# Patient Record
Sex: FEMALE | Race: WHITE | NOT HISPANIC OR LATINO | Employment: FULL TIME | URBAN - METROPOLITAN AREA
[De-identification: names, ages, dates, MRNs, and addresses within clinical notes are randomized per-mention and may not be internally consistent; named-entity substitution may affect disease eponyms.]

---

## 2017-02-06 ENCOUNTER — HOSPITAL ENCOUNTER (EMERGENCY)
Facility: MEDICAL CENTER | Age: 27
End: 2017-02-06
Attending: EMERGENCY MEDICINE
Payer: MEDICAID

## 2017-02-06 VITALS
WEIGHT: 192.46 LBS | SYSTOLIC BLOOD PRESSURE: 115 MMHG | HEART RATE: 55 BPM | HEIGHT: 63 IN | OXYGEN SATURATION: 98 % | TEMPERATURE: 98.5 F | DIASTOLIC BLOOD PRESSURE: 63 MMHG | BODY MASS INDEX: 34.1 KG/M2 | RESPIRATION RATE: 18 BRPM

## 2017-02-06 DIAGNOSIS — J03.90 ACUTE TONSILLITIS, UNSPECIFIED ETIOLOGY: ICD-10-CM

## 2017-02-06 PROCEDURE — 99283 EMERGENCY DEPT VISIT LOW MDM: CPT | Mod: EDC

## 2017-02-06 RX ORDER — AZITHROMYCIN 250 MG/1
TABLET, FILM COATED ORAL
Qty: 6 TAB | Refills: 0 | Status: SHIPPED | OUTPATIENT
Start: 2017-02-06 | End: 2018-01-11

## 2017-02-06 ASSESSMENT — PAIN SCALES - GENERAL: PAINLEVEL_OUTOF10: 0

## 2017-02-06 NOTE — ED AVS SNAPSHOT
[employer] Access Code: [email or web address]  Expires: 3/8/2017  8:29 AM    Your email address is not on file at [facility].  Email Addresses are required for you to sign up for [employer], please contact [phone] to verify your personal information and to provide your email address prior to attempting to register for [employer].    Carrie Adler  [address] [place] [address]    [employer]  A secure, online tool to manage your health information     [facility]’s [employer]® is a secure, online tool that connects you to your personalized health information from the privacy of your home -- day or night - making it very easy for you to manage your healthcare. Once the activation process is completed, you can even access your medical information using the [employer] ashley, which is available for free in the Apple Ashley store or Google Play store.     To learn more about [employer], visit [email or web address][email or web address]t    There are two levels of access available (as shown below):   My Chart Features  [facility] Primary Care Doctor [facility]  Specialists [facility]  Urgent  Care Non-[facility] Primary Care Doctor   Email your healthcare team securely and privately 24/7 X X X    Manage appointments: schedule your next appointment; view details of past/upcoming appointments X      Request prescription refills. X      View recent personal medical records, including lab and immunizations X X X X   View health record, including health history, allergies, medications X X X X   Read reports about your outpatient visits, procedures, consult and ER notes X X X X   See your discharge summary, which is a recap of your hospital and/or ER visit that includes your diagnosis, lab results, and care plan X X  X     How to register for "[employer]t:  Once your e-mail address has been verified, follow the following steps to sign up for "[employer]t.     1. Go to  [email or web address]  2. Click on the Sign Up Now box, which takes you to the New Member Sign Up page. You  will need to provide the following information:  a. Enter your [employer] Access Code exactly as it appears at the top of this page. (You will not need to use this code after you’ve completed the sign-up process. If you do not sign up before the expiration date, you must request a new code.)   b. Enter your date of birth.   c. Enter your home email address.   d. Click Submit, and follow the next screen’s instructions.  3. Create a [employer]t ID. This will be your [employer] login ID and cannot be changed, so think of one that is secure and easy to remember.  4. Create a [employer] password. You can change your password at any time.  5. Enter your Password Reset Question and Answer. This can be used at a later time if you forget your password.   6. Enter your e-mail address. This allows you to receive e-mail notifications when new information is available in [employer].  7. Click Sign Up. You can now view your health information.    For assistance activating your [employer] account, call [phone]

## 2017-02-06 NOTE — ED PROVIDER NOTES
"ED Provider Note    CHIEF COMPLAINT  Chief Complaint   Patient presents with   • Sore Throat       HPI  Carrie Adler is a 26 y.o. female who presents for 4 days of sore throat with pus on tonsils. Denies fever or headache rhinorrhea or cough. 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 Denies pregnancy. Daughter is ill with cough.    REVIEW OF SYSTEMS  Pertinent positives include: Sore throat, exudate.  Pertinent negatives include: Vomiting, diarrhea, rash, abdominal pain, pregnancy.    PAST MEDICAL HISTORY  Past Medical History   Diagnosis Date   • Endometriosis 7/17/2009   • Endometriosis        SOCIAL HISTORY  No tobacco use.    CURRENT MEDICATIONS  Home Medications     Reviewed by Princess Weems R.N. (Registered Nurse) on 02/06/17 at 0751  Med List Status: Complete    Medication Last Dose Status          Patient Margarito Taking any Medications                        ALLERGIES  Allergies   Allergen Reactions   • Penicillins Rash       PHYSICAL EXAM  VITAL SIGNS: /67 mmHg  Pulse 69  Temp(Src) 36.2 °C (97.2 °F)  Resp 20  Ht 1.6 m (5' 3\")  Wt 87.3 kg (192 lb 7.4 oz)  BMI 34.10 kg/m2  SpO2 96%  LMP 12/16/2010  Constitutional :  Well developed, Well nourished, vitals reviewed and normal including no hypoxia room air.   HNT: Atraumatic, tender submandibular glands, no trismus, 3+ mildly erythematous tonsils with scant exudate, uvula midline.   Ears: External ears normal.  Eyes: pupils reactive without eye discharge nor conjunctival hyperemia.  Neck: Normal range of motion, No tenderness, Supple, No stridor.   Lymphatic: No adenopathy.   Cardiovascular: Regular rhythm, No murmurs, No rubs, No gallops.  No cyanosis.   Respiratory: No rales, rhonchi, wheeze  Abdomen:  Soft, nontender  Skin: Warm, dry, no erythema, no rash.   Musculoskeletal: no limb deformities.      COURSE & MEDICAL DECISION MAKING  Well-appearing patient presents with an exudative acute " tonsillitis without evidence of peritonsillar abscess, epiglottitis, retropharyngeal abscess. This may be strep.    PLAN:  New Prescriptions    AZITHROMYCIN (ZITHROMAX) 250 MG TAB    Sig: Per instructions on packet     Return for ill appearance or inability to swallow  Ibuprofen and Tylenol  Tonsillitis handout given    Martin Brown M.D.  [address] [place] [address] [address]  [facility] [address]  [phone]    Schedule an appointment as soon as possible for a visit in 3 days  As needed        CONDITION:  Good.    FINAL IMPRESSION:  1. Acute tonsillitis, unspecified etiology          Electronically signed by: Lb Mckeon, 2/6/2017

## 2017-02-06 NOTE — ED NOTES
Discharge instructions reviewed with pt regarding strep, azithromax.  Pt instructed on signs and symptoms to return to ED, no questions regarding this.   Instructed to follow-up with PCP.  Pt has no questions at this time, VSS.  Pt leaves alert, age appropriate and in NAD.

## 2017-02-06 NOTE — ED AVS SNAPSHOT
Home Care Instructions                                                                                                                Carrie Adler   MRN: [id number]    Department:  [facility], Emergency Dept   Date of Visit:  2/6/2017            [facility], Emergency Dept    [address] [address]    Phone:  [phone]      You were seen by     Lb Mckeon M.D.      Your Diagnosis Was     Acute tonsillitis, unspecified etiology     J03.90       Follow-up Information     1. Follow up with Martin Brown M.D.. Schedule an appointment as soon as possible for a visit in 3 days.    Specialty:  OB/Gyn    Why:  As needed    Contact information    [address]  [facility] [id number] [phone]        Medication Information     Review all of your home medications and newly ordered medications with your primary doctor and/or pharmacist as soon as possible. Follow medication instructions as directed by your doctor and/or pharmacist.     Please keep your complete medication list with you and share with your physician. Update the information when medications are discontinued, doses are changed, or new medications (including over-the-counter products) are added; and carry medication information at all times in the event of emergency situations.               Medication List      START taking these medications        Instructions    azithromycin 250 MG Tabs   Commonly known as:  ZITHROMAX    Sig: Per instructions on packet                 Discharge Instructions       Tonsillitis  Take ibuprofen 600 800 mg 4 times a day and Tylenol as needed for pain. Take antibiotics as prescribed. Return for shortness of breath or inability to swallow. Follow-up with your physician if not better in 3-4 days.     Tonsillitis is an infection of the throat that causes the tonsils to become red, tender, and swollen. Tonsils are collections of lymphoid tissue at the back of the throat.  Each tonsil has crevices (crypts). Tonsils help fight nose and throat infections and keep infection from spreading to other parts of the body for the first 18 months of life.   CAUSES  Sudden (acute) tonsillitis is usually caused by infection with streptococcal bacteria. Long-lasting (chronic) tonsillitis occurs when the crypts of the tonsils become filled with pieces of food and bacteria, which makes it easy for the tonsils to become repeatedly infected.  SYMPTOMS   Symptoms of tonsillitis include:  · A sore throat, with possible difficulty swallowing.  · White patches on the tonsils.  · Fever.  · Tiredness.  · New episodes of snoring during sleep, when you did not snore before.  · Small, foul-smelling, yellowish-white pieces of material (tonsilloliths) that you occasionally cough up or spit out. The tonsilloliths can also cause you to have bad breath.  DIAGNOSIS  Tonsillitis can be diagnosed through a physical exam. Diagnosis can be confirmed with the results of lab tests, including a throat culture.  TREATMENT   The goals of tonsillitis treatment include the reduction of the severity and duration of symptoms and prevention of associated conditions. Symptoms of tonsillitis can be improved with the use of steroids to reduce the swelling. Tonsillitis caused by bacteria can be treated with antibiotic medicines. Usually, treatment with antibiotic medicines is started before the cause of the tonsillitis is known. However, if it is determined that the cause is not bacterial, antibiotic medicines will not treat the tonsillitis. If attacks of tonsillitis are severe and frequent, your health care provider may recommend surgery to remove the tonsils (tonsillectomy).  HOME CARE INSTRUCTIONS   · Rest as much as possible and get plenty of sleep.  · Drink plenty of fluids. While the throat is very sore, eat soft foods or liquids, such as sherbet, soups, or instant breakfast drinks.  · Eat frozen ice pops.  · Gargle with a warm  or cold liquid to help soothe the throat. Mix 1/4 teaspoon of salt and 1/4 teaspoon of baking soda in 8 oz of water.  SEEK MEDICAL CARE IF:   · Large, tender lumps develop in your neck.  · A rash develops.  · A green, yellow-brown, or bloody substance is coughed up.  · You are unable to swallow liquids or food for 24 hours.  · You notice that only one of the tonsils is swollen.  SEEK IMMEDIATE MEDICAL CARE IF:   · You develop any new symptoms such as vomiting, severe headache, stiff neck, chest pain, or trouble breathing or swallowing.  · You have severe throat pain along with drooling or voice changes.  · You have severe pain, unrelieved with recommended medications.  · You are unable to fully open the mouth.  · You develop redness, swelling, or severe pain anywhere in the neck.  · You have a fever.  MAKE SURE YOU:   · Understand these instructions.  · Will watch your condition.  · Will get help right away if you are not doing well or get worse.     This information is not intended to replace advice given to you by your health care provider. Make sure you discuss any questions you have with your health care provider.     Document Released: 09/27/2006 Document Revised: 01/08/2016 Document Reviewed: 06/06/2014  [employer] Patient Education ©2016 [employer]            Patient Information     Patient Information    Following emergency treatment: all patient requiring follow-up care must return either to a private physician or a clinic if your condition worsens before you are able to obtain further medical attention, please return to the emergency room.     Billing Information    At [facility], we work to make the billing process streamlined for our patients.  Our Representatives are here to answer any questions you may have regarding your hospital bill.  If you have insurance coverage and have supplied your insurance information to us, we will submit a claim to your insurer on your behalf.  Should you  have any questions regarding your bill, we can be reached online or by phone as follows:  Online: You are able pay your bills online or live chat with our representatives about any billing questions you may have. We are here to help Monday - Friday from 8:00am to 7:30pm and 9:00am - 12:00pm on Saturdays.  Please visit [email or web address]  for more information.   Phone:  [phone] or [phone]    Please note that your emergency physician, surgeon, pathologist, radiologist, anesthesiologist, and other specialists are not employed by [facility] and will therefore bill separately for their services.  Please contact them directly for any questions concerning their bills at the numbers below:     Emer[facility] Physician Services:  [phone]  [facility]:  [phone]  [facility]:  [phone]  [facility]:  [phone]    1. Your final bill may vary from the amount quoted upon discharge if all procedures are not complete at that time, or if your doctor has additional procedures of which we are not aware. You will receive an additional bill if you return to the Emergency Department at [facility] for suture removal regardless of the facility of which the sutures were placed.     2. Please arrange for settlement of this account at the emergency registration.    3. All self-pay accounts are due in full at the time of treatment.  If you are unable to meet this obligation then payment is expected within 4-5 days.     4. If you have had radiology studies (CT, X-ray, Ultrasound, MRI), you have received a preliminary result during your emergency department visit. Please contact the radiology department [phone] to receive a copy of your final result. Please discuss the Final result with your primary physician or with the follow up physician provided.     Crisis Hotline:  National Crisis Hotline:  2-519-QEEMOXM or  1-601.366.8458  [place] Crisis Hotline:    1-529.487.8732 or 410-387-1090         ED Discharge Follow Up Questions    1. In order to provide you with very good care, we would like to follow up with a phone call in the next few days.  May we have your permission to contact you?     YES /  NO    2. What is the best phone number to call you? (       )_____-__________    3. What is the best time to call you?      Morning  /  Afternoon  /  Evening                   Patient Signature:  ____________________________________________________________    Date:  ____________________________________________________________

## 2017-02-06 NOTE — ED AVS SNAPSHOT
2/6/2017          Carrie Adler  [address]  [place] [address]    Dear Carrie:    [facility] wants to ensure your discharge home is safe and you or your loved ones have had all your questions answered regarding your care after you leave the hospital.    You may receive a telephone call within two days of your discharge.  This call is to make certain you understand your discharge instructions as well as ensure we provided you with the best care possible during your stay with us.     The call will only last approximately 3-5 minutes and will be done by a nurse.    Once again, we want to ensure your discharge home is safe and that you have a clear understanding of any next steps in your care.  If you have any questions or concerns, please do not hesitate to contact us, we are here for you.  Thank you for choosing [facility] for your healthcare needs.    Sincerely,    Julio Gonzalez    [facility]

## 2017-02-06 NOTE — DISCHARGE INSTRUCTIONS
Tonsillitis  Take ibuprofen 600 800 mg 4 times a day and Tylenol as needed for pain. Take antibiotics as prescribed. Return for shortness of breath or inability to swallow. Follow-up with your physician if not better in 3-4 days.     Tonsillitis is an infection of the throat that causes the tonsils to become red, tender, and swollen. Tonsils are collections of lymphoid tissue at the back of the throat. Each tonsil has crevices (crypts). Tonsils help fight nose and throat infections and keep infection from spreading to other parts of the body for the first 18 months of life.   CAUSES  Sudden (acute) tonsillitis is usually caused by infection with streptococcal bacteria. Long-lasting (chronic) tonsillitis occurs when the crypts of the tonsils become filled with pieces of food and bacteria, which makes it easy for the tonsils to become repeatedly infected.  SYMPTOMS   Symptoms of tonsillitis include:  · A sore throat, with possible difficulty swallowing.  · White patches on the tonsils.  · Fever.  · Tiredness.  · New episodes of snoring during sleep, when you did not snore before.  · Small, foul-smelling, yellowish-white pieces of material (tonsilloliths) that you occasionally cough up or spit out. The tonsilloliths can also cause you to have bad breath.  DIAGNOSIS  Tonsillitis can be diagnosed through a physical exam. Diagnosis can be confirmed with the results of lab tests, including a throat culture.  TREATMENT   The goals of tonsillitis treatment include the reduction of the severity and duration of symptoms and prevention of associated conditions. Symptoms of tonsillitis can be improved with the use of steroids to reduce the swelling. Tonsillitis caused by bacteria can be treated with antibiotic medicines. Usually, treatment with antibiotic medicines is started before the cause of the tonsillitis is known. However, if it is determined that the cause is not bacterial, antibiotic medicines will not treat the  tonsillitis. If attacks of tonsillitis are severe and frequent, your health care provider may recommend surgery to remove the tonsils (tonsillectomy).  HOME CARE INSTRUCTIONS   · Rest as much as possible and get plenty of sleep.  · Drink plenty of fluids. While the throat is very sore, eat soft foods or liquids, such as sherbet, soups, or instant breakfast drinks.  · Eat frozen ice pops.  · Gargle with a warm or cold liquid to help soothe the throat. Mix 1/4 teaspoon of salt and 1/4 teaspoon of baking soda in 8 oz of water.  SEEK MEDICAL CARE IF:   · Large, tender lumps develop in your neck.  · A rash develops.  · A green, yellow-brown, or bloody substance is coughed up.  · You are unable to swallow liquids or food for 24 hours.  · You notice that only one of the tonsils is swollen.  SEEK IMMEDIATE MEDICAL CARE IF:   · You develop any new symptoms such as vomiting, severe headache, stiff neck, chest pain, or trouble breathing or swallowing.  · You have severe throat pain along with drooling or voice changes.  · You have severe pain, unrelieved with recommended medications.  · You are unable to fully open the mouth.  · You develop redness, swelling, or severe pain anywhere in the neck.  · You have a fever.  MAKE SURE YOU:   · Understand these instructions.  · Will watch your condition.  · Will get help right away if you are not doing well or get worse.     This information is not intended to replace advice given to you by your health care provider. Make sure you discuss any questions you have with your health care provider.     Document Released: 09/27/2006 Document Revised: 01/08/2016 Document Reviewed: 06/06/2014  [employer] Interactive Patient Education ©2016 [employer]

## 2018-01-11 DIAGNOSIS — Z01.812 PRE-OPERATIVE LABORATORY EXAMINATION: ICD-10-CM

## 2018-01-11 DIAGNOSIS — Z01.810 PRE-OPERATIVE CARDIOVASCULAR EXAMINATION: ICD-10-CM

## 2018-01-11 LAB
ANION GAP SERPL CALC-SCNC: 7 MMOL/L (ref 0–11.9)
BUN SERPL-MCNC: 14 MG/DL (ref 8–22)
CALCIUM SERPL-MCNC: 9.7 MG/DL (ref 8.5–10.5)
CHLORIDE SERPL-SCNC: 103 MMOL/L (ref 96–112)
CO2 SERPL-SCNC: 23 MMOL/L (ref 20–33)
CREAT SERPL-MCNC: 0.86 MG/DL (ref 0.5–1.4)
EKG IMPRESSION: NORMAL
ERYTHROCYTE [DISTWIDTH] IN BLOOD BY AUTOMATED COUNT: 38 FL (ref 35.9–50)
GLUCOSE SERPL-MCNC: 86 MG/DL (ref 65–99)
HCT VFR BLD AUTO: 41.5 % (ref 37–47)
HGB BLD-MCNC: 14.6 G/DL (ref 12–16)
MCH RBC QN AUTO: 29.9 PG (ref 27–33)
MCHC RBC AUTO-ENTMCNC: 35.2 G/DL (ref 33.6–35)
MCV RBC AUTO: 85 FL (ref 81.4–97.8)
PLATELET # BLD AUTO: 351 K/UL (ref 164–446)
PMV BLD AUTO: 11 FL (ref 9–12.9)
POTASSIUM SERPL-SCNC: 3.9 MMOL/L (ref 3.6–5.5)
RBC # BLD AUTO: 4.88 M/UL (ref 4.2–5.4)
SODIUM SERPL-SCNC: 133 MMOL/L (ref 135–145)
WBC # BLD AUTO: 10 K/UL (ref 4.8–10.8)

## 2018-01-11 PROCEDURE — 36415 COLL VENOUS BLD VENIPUNCTURE: CPT

## 2018-01-11 PROCEDURE — 93005 ELECTROCARDIOGRAM TRACING: CPT

## 2018-01-11 PROCEDURE — 80048 BASIC METABOLIC PNL TOTAL CA: CPT

## 2018-01-11 PROCEDURE — 85027 COMPLETE CBC AUTOMATED: CPT

## 2018-01-11 PROCEDURE — 93010 ELECTROCARDIOGRAM REPORT: CPT | Performed by: INTERNAL MEDICINE

## 2018-01-11 RX ORDER — VALACYCLOVIR HYDROCHLORIDE 500 MG/1
500 TABLET, FILM COATED ORAL 2 TIMES DAILY
COMMUNITY

## 2018-01-20 ENCOUNTER — HOSPITAL ENCOUNTER (OUTPATIENT)
Facility: MEDICAL CENTER | Age: 28
End: 2018-01-20
Attending: SPECIALIST | Admitting: SPECIALIST
Payer: MEDICAID

## 2018-01-20 VITALS
RESPIRATION RATE: 18 BRPM | DIASTOLIC BLOOD PRESSURE: 51 MMHG | BODY MASS INDEX: 34.18 KG/M2 | TEMPERATURE: 97.8 F | HEIGHT: 63 IN | SYSTOLIC BLOOD PRESSURE: 95 MMHG | WEIGHT: 192.9 LBS | OXYGEN SATURATION: 97 % | HEART RATE: 47 BPM

## 2018-01-20 DIAGNOSIS — G89.18 POST-OP PAIN: ICD-10-CM

## 2018-01-20 PROCEDURE — 500886 HCHG PACK, LAPAROSCOPY: Performed by: SPECIALIST

## 2018-01-20 PROCEDURE — 160029 HCHG SURGERY MINUTES - 1ST 30 MINS LEVEL 4: Performed by: SPECIALIST

## 2018-01-20 PROCEDURE — 160048 HCHG OR STATISTICAL LEVEL 1-5: Performed by: SPECIALIST

## 2018-01-20 PROCEDURE — 160046 HCHG PACU - 1ST 60 MINS PHASE II: Performed by: SPECIALIST

## 2018-01-20 PROCEDURE — 501399 HCHG SPECIMAN BAG, ENDO CATC: Performed by: SPECIALIST

## 2018-01-20 PROCEDURE — 501838 HCHG SUTURE GENERAL: Performed by: SPECIALIST

## 2018-01-20 PROCEDURE — 700101 HCHG RX REV CODE 250

## 2018-01-20 PROCEDURE — 700111 HCHG RX REV CODE 636 W/ 250 OVERRIDE (IP)

## 2018-01-20 PROCEDURE — A9270 NON-COVERED ITEM OR SERVICE: HCPCS

## 2018-01-20 PROCEDURE — 700102 HCHG RX REV CODE 250 W/ 637 OVERRIDE(OP): Performed by: SPECIALIST

## 2018-01-20 PROCEDURE — 160035 HCHG PACU - 1ST 60 MINS PHASE I: Performed by: SPECIALIST

## 2018-01-20 PROCEDURE — 160009 HCHG ANES TIME/MIN: Performed by: SPECIALIST

## 2018-01-20 PROCEDURE — 500854 HCHG NEEDLE, INSUFFLATION FOR STEP: Performed by: SPECIALIST

## 2018-01-20 PROCEDURE — 501579 HCHG TROCAR, STEP 5MM: Performed by: SPECIALIST

## 2018-01-20 PROCEDURE — A9270 NON-COVERED ITEM OR SERVICE: HCPCS | Performed by: SPECIALIST

## 2018-01-20 PROCEDURE — 160041 HCHG SURGERY MINUTES - EA ADDL 1 MIN LEVEL 4: Performed by: SPECIALIST

## 2018-01-20 PROCEDURE — 88305 TISSUE EXAM BY PATHOLOGIST: CPT

## 2018-01-20 PROCEDURE — 700102 HCHG RX REV CODE 250 W/ 637 OVERRIDE(OP)

## 2018-01-20 PROCEDURE — 160036 HCHG PACU - EA ADDL 30 MINS PHASE I: Performed by: SPECIALIST

## 2018-01-20 PROCEDURE — 500577 HCHG FORCEP, BIPO CUT (EVEREST): Performed by: SPECIALIST

## 2018-01-20 PROCEDURE — 160002 HCHG RECOVERY MINUTES (STAT): Performed by: SPECIALIST

## 2018-01-20 PROCEDURE — 160025 RECOVERY II MINUTES (STATS): Performed by: SPECIALIST

## 2018-01-20 RX ORDER — OXYCODONE HCL 5 MG/5 ML
SOLUTION, ORAL ORAL
Status: COMPLETED
Start: 2018-01-20 | End: 2018-01-20

## 2018-01-20 RX ORDER — SIMETHICONE 80 MG
80 TABLET,CHEWABLE ORAL EVERY 8 HOURS PRN
Status: DISCONTINUED | OUTPATIENT
Start: 2018-01-20 | End: 2018-01-20 | Stop reason: HOSPADM

## 2018-01-20 RX ORDER — OXYCODONE HYDROCHLORIDE AND ACETAMINOPHEN 5; 325 MG/1; MG/1
1 TABLET ORAL EVERY 6 HOURS PRN
Qty: 28 TAB | Refills: 0 | Status: SHIPPED | OUTPATIENT
Start: 2018-01-20 | End: 2018-01-27

## 2018-01-20 RX ORDER — SODIUM CHLORIDE, SODIUM LACTATE, POTASSIUM CHLORIDE, CALCIUM CHLORIDE 600; 310; 30; 20 MG/100ML; MG/100ML; MG/100ML; MG/100ML
INJECTION, SOLUTION INTRAVENOUS CONTINUOUS
Status: DISCONTINUED | OUTPATIENT
Start: 2018-01-20 | End: 2018-01-20 | Stop reason: HOSPADM

## 2018-01-20 RX ORDER — SCOLOPAMINE TRANSDERMAL SYSTEM 1 MG/1
PATCH, EXTENDED RELEASE TRANSDERMAL
Status: DISCONTINUED
Start: 2018-01-20 | End: 2018-01-20 | Stop reason: HOSPADM

## 2018-01-20 RX ORDER — ONDANSETRON 2 MG/ML
4 INJECTION INTRAMUSCULAR; INTRAVENOUS EVERY 6 HOURS PRN
Status: DISCONTINUED | OUTPATIENT
Start: 2018-01-20 | End: 2018-01-20 | Stop reason: HOSPADM

## 2018-01-20 RX ORDER — IBUPROFEN 800 MG/1
800 TABLET ORAL EVERY 8 HOURS PRN
Qty: 30 TAB | Refills: 0 | Status: SHIPPED | OUTPATIENT
Start: 2018-01-20

## 2018-01-20 RX ORDER — OXYCODONE HYDROCHLORIDE AND ACETAMINOPHEN 5; 325 MG/1; MG/1
1 TABLET ORAL EVERY 6 HOURS PRN
Status: DISCONTINUED | OUTPATIENT
Start: 2018-01-20 | End: 2018-01-20 | Stop reason: HOSPADM

## 2018-01-20 RX ORDER — SCOLOPAMINE TRANSDERMAL SYSTEM 1 MG/1
1 PATCH, EXTENDED RELEASE TRANSDERMAL
Status: DISCONTINUED | OUTPATIENT
Start: 2018-01-20 | End: 2018-01-20 | Stop reason: HOSPADM

## 2018-01-20 RX ORDER — HALOPERIDOL 5 MG/ML
INJECTION INTRAMUSCULAR
Status: COMPLETED
Start: 2018-01-20 | End: 2018-01-20

## 2018-01-20 RX ORDER — LIDOCAINE HYDROCHLORIDE 10 MG/ML
INJECTION, SOLUTION INFILTRATION; PERINEURAL
Status: COMPLETED
Start: 2018-01-20 | End: 2018-01-20

## 2018-01-20 RX ORDER — IBUPROFEN 200 MG
800 TABLET ORAL EVERY 8 HOURS PRN
Status: DISCONTINUED | OUTPATIENT
Start: 2018-01-20 | End: 2018-01-20 | Stop reason: HOSPADM

## 2018-01-20 RX ADMIN — ESTRADIOL 1 PATCH: 0.1 PATCH TRANSDERMAL at 12:00

## 2018-01-20 RX ADMIN — OXYCODONE HYDROCHLORIDE 5 MG: 5 SOLUTION ORAL at 11:30

## 2018-01-20 RX ADMIN — HALOPERIDOL LACTATE 1 MG: 5 INJECTION, SOLUTION INTRAMUSCULAR at 12:15

## 2018-01-20 RX ADMIN — SCOLOPAMINE TRANSDERMAL SYSTEM 1 PATCH: 1 PATCH, EXTENDED RELEASE TRANSDERMAL at 08:43

## 2018-01-20 RX ADMIN — FENTANYL CITRATE 50 MCG: 50 INJECTION, SOLUTION INTRAMUSCULAR; INTRAVENOUS at 11:28

## 2018-01-20 RX ADMIN — SODIUM CHLORIDE, SODIUM LACTATE, POTASSIUM CHLORIDE, CALCIUM CHLORIDE: 600; 310; 30; 20 INJECTION, SOLUTION INTRAVENOUS at 08:15

## 2018-01-20 RX ADMIN — OXYCODONE HYDROCHLORIDE 5 MG: 5 SOLUTION ORAL at 12:05

## 2018-01-20 RX ADMIN — FENTANYL CITRATE 50 MCG: 50 INJECTION, SOLUTION INTRAMUSCULAR; INTRAVENOUS at 12:05

## 2018-01-20 RX ADMIN — LIDOCAINE HYDROCHLORIDE: 10 INJECTION, SOLUTION INFILTRATION; PERINEURAL at 08:15

## 2018-01-20 ASSESSMENT — PAIN SCALES - GENERAL
PAINLEVEL_OUTOF10: 0
PAINLEVEL_OUTOF10: 3
PAINLEVEL_OUTOF10: 7
PAINLEVEL_OUTOF10: 7
PAINLEVEL_OUTOF10: 3
PAINLEVEL_OUTOF10: 5
PAINLEVEL_OUTOF10: 0

## 2018-01-20 NOTE — OR SURGEON
Immediate Post OP Note    PreOp Diagnosis:   Pelvic pain  History of endometriosis    PostOp Diagnosis:   Pelvic pain  History of endometriosis    Procedure(s):  PELVISCOPY - Wound Class: Clean Contaminated  OOPHORECTOMY - Wound Class: Clean Contaminated    Surgeon(s):  Martin Brown M.D.    Anesthesiologist/Type of Anesthesia:  Anesthesiologist: Maximus Dee M.D./General    Surgical Staff:  Circulator: Cheikh Chen R.N.  Scrub Person: Maki Shane    Specimens:  Left ovary    Estimated Blood Loss:  Less than 20 mL    Findings:  At laparoscopy absence of the uterus is noted. Absence of the right fallopian tube is noted. Absence of the right ovary is noted. Absence of the left fallopian tube was noted. The left ovary is identified and appears normal. The left ovarian fossa is normal. There are no intraperitoneal pelvic adhesions seen. Other than from some scar tissue along the sides of the pelvis no evidence of endometriosis is seen. After removal of the left ovary the left side of the pelvis is carefully examined and hemostasis was noted to be excellent.    Complications:  None        1/20/2018 11:21 AM Martin Brown

## 2018-01-20 NOTE — DISCHARGE INSTRUCTIONS
ACTIVITY: Rest and take it easy for the first 24 hours.  A responsible adult is recommended to remain with you during that time.  It is normal to feel sleepy.  We encourage you to not do anything that requires balance, judgment or coordination.    MILD FLU-LIKE SYMPTOMS ARE NORMAL. YOU MAY EXPERIENCE GENERALIZED MUSCLE ACHES, THROAT IRRITATION, HEADACHE AND/OR SOME NAUSEA.    FOR 24 HOURS DO NOT:  Drive, operate machinery or run household appliances.  Drink beer or alcoholic beverages.   Make important decisions or sign legal documents.    SPECIAL INSTRUCTIONS: *Follow any instructions given to you by Dr. Brown    Diagnostic Laparoscopy    A diagnostic laparoscopy is a procedure to diagnose diseases in the abdomen. During the procedure, a thin, lighted, pencil-sized instrument called a laparoscope is inserted into the abdomen through an incision. The laparoscope allows your health care provider to look at the organs inside your body.  LET YOUR HEALTH CARE PROVIDER KNOW ABOUT:  · Any allergies you have.  · All medicines you are taking, including vitamins, herbs, eye drops, creams, and over-the-counter medicines.  · Previous problems you or members of your family have had with the use of anesthetics.  · Any blood disorders you have.  · Previous surgeries you have had.  · Medical conditions you have.  RISKS AND COMPLICATIONS   Generally, this is a safe procedure. However, problems can occur, which may include:  · Infection.  · Bleeding.  · Damage to other organs.  · Allergic reaction to the anesthetics used during the procedure.  BEFORE THE PROCEDURE  · Do not eat or drink anything after midnight on the night before the procedure or as directed by your health care provider.  · Ask your health care provider about:  ¨ Changing or stopping your regular medicines.  ¨ Taking medicines such as aspirin and ibuprofen. These medicines can thin your blood. Do not take these medicines before your procedure if your health care  provider instructs you not to.  · Plan to have someone take you home after the procedure.  PROCEDURE  · You may be given a medicine to help you relax (sedative).  · You will be given a medicine to make you sleep (general anesthesic).  · Your abdomen will be inflated with a gas. This will make your organs easier to see.  · Small incisions will be made in your abdomen.  · A laparoscope and other small instruments will be inserted into the abdomen through the incisions.  · A tissue sample may be removed from an organ in the abdomen for examination.  · The instruments will be removed from the abdomen.  · The gas will be released.  · The incisions will be closed with stitches (sutures).  AFTER THE PROCEDURE   Your blood pressure, heart rate, breathing rate, and blood oxygen level will be monitored often until the medicines you were given have worn off.     This information is not intended to replace advice given to you by your health care provider. Make sure you discuss any questions you have with your health care provider.     Document Released: 03/26/2002 Document Revised: 01/08/2016 Document Reviewed: 07/31/2015  [employer] Interactive Patient Education ©2016 [employer]  **    DIET: To avoid nausea, slowly advance diet as tolerated, avoiding spicy or greasy foods for the first day.  Add more substantial food to your diet according to your physician's instructions.  Babies can be fed formula or breast milk as soon as they are hungry.  INCREASE FLUIDS AND FIBER TO AVOID CONSTIPATION.    SURGICAL DRESSING/BATHING: *You may shower but no soaking baths.**    FOLLOW-UP APPOINTMENT:  A follow-up appointment should be arranged with your doctor in *2 weeks**; call to schedule Dr. Brown [phone].    You should CALL YOUR PHYSICIAN if you develop:  Fever greater than 101 degrees F.  Pain not relieved by medication, or persistent nausea or vomiting.  Excessive bleeding (blood soaking through dressing) or unexpected  drainage from the wound.  Extreme redness or swelling around the incision site, drainage of pus or foul smelling drainage.  Inability to urinate or empty your bladder within 8 hours.  Problems with breathing or chest pain.    You should call 911 if you develop problems with breathing or chest pain.  If you are unable to contact your doctor or surgical center, you should go to the nearest emergency room or urgent care center.  Physician's telephone #: *Dr. Brown [phone].**    If any questions arise, call your doctor.  If your doctor is not available, please feel free to call the Surgical Center at {Surgical Dept Numbers:[id number]}.  The Center is open Monday through Friday from 7AM to 7PM.  You can also call the HEALTH HOTLINE open 24 hours/day, 7 days/week and speak to a nurse at [phone], or toll free at [phone].    A registered nurse may call you a few days after your surgery to see how you are doing after your procedure.    MEDICATIONS: Resume taking daily medication.  Take prescribed pain medication with food.  If no medication is prescribed, you may take non-aspirin pain medication if needed.  PAIN MEDICATION CAN BE VERY CONSTIPATING.  Take a stool softener or laxative such as senokot, pericolace, or milk of magnesia if needed.    Prescription given for *Ibuprofen and Percocet**.  Last pain medication given at *1205**.    If your physician has prescribed pain medication that includes Acetaminophen (Tylenol), do not take additional Acetaminophen (Tylenol) while taking the prescribed medication.    Depression / Suicide Risk    As you are discharged from this [facility] facility, it is important to learn how to keep safe from harming yourself.    Recognize the warning signs:  · Abrupt changes in personality, positive or negative- including increase in energy   · Giving away possessions  · Change in eating patterns- significant weight changes-  positive or negative  · Change in sleeping  patterns- unable to sleep or sleeping all the time   · Unwillingness or inability to communicate  · Depression  · Unusual sadness, discouragement and loneliness  · Talk of wanting to die  · Neglect of personal appearance   · Rebelliousness- reckless behavior  · Withdrawal from people/activities they love  · Confusion- inability to concentrate     If you or a loved one observes any of these behaviors or has concerns about self-harm, here's what you can do:  · Talk about it- your feelings and reasons for harming yourself  · Remove any means that you might use to hurt yourself (examples: pills, rope, extension cords, firearm)  · Get professional help from the community (Mental Health, Substance Abuse, psychological counseling)  · Do not be alone:Call your Safe Contact- someone whom you trust who will be there for you.  · Call your local CRISIS HOTLINE 071-2311 or 489-751-0273  · Call your local Children's Mobile Crisis Response Team [place] (378) 146-5195 or www.eTelemetry  · Call the toll free National Suicide Prevention Hotlines   · National Suicide Prevention Lifeline 972-508-MVRP (7472)  · National Hope Line Network 800-SUICIDE (939-5819)

## 2018-01-20 NOTE — H&P
Carrie Adler          YOB: 1990  Date of today's surgery: Saturday, January 20, 2018  Facility: [facility]    ID: The patient is a very pleasant 27-year-old primipara (para-1 with one previous vaginal delivery).    Chief Complaint: The patient complains of pelvic pain.    History of Present Illness: The patient has pelvic pain and has in the past been diagnosed with endometriosis.  On December 12, 2016 I performed a laparoscopy, diagnostic and operative, with laparoscopic right oophorectomy at [facility].  The preoperative diagnoses had included severe pelvic pain, likely right ovarian torsion, and the ultrasound revealed no Doppler flow to the right ovary and the postoperative diagnoses included severe pelvic pain with no ovarian torsion.  Of note, it was on December 21, 2011 that I performed a total laparoscopic hysterectomy using the da Dustin robot followed by cystoscopy.  This hysterectomy was performed because of pelvic pain and endometriosis.  She very much wishes for me to remove her left ovary.  She continues to have pelvic pain.  Of note I have had very lengthy discussions with her, in the office, on multiple different occasions and after these lengthy discussions she still very much wishes for us to proceed with left oophorectomy.  Of note I have discussed with her and actually to her in detail and at length what laparoscopy with left oophorectomy is and what laparoscopy with left oophorectomy involves and I have discussed with her and expand to her in detail and at length the risks and benefits and alternatives of laparoscopy with laparoscopic left oophorectomy.  She understands that by removing her one remaining ovary that she will be in surgical menopause and I had a long discussion with her today about the subject menopause at about the subject of surgical menopause and after our discussions and after answering her questions she told me  that she very much wishes for us to proceed with laparoscopy with laparoscopic left oophorectomy.    Past Medical History: The patient tells me that she has no medical illnesses other than for her history of endometriosis.    Past Surgical History: The patient has had an appendectomy and multiple laparoscopies and endometrial ablation and then total laparoscopic hysterectomy using the da Dustin robot and right oophorectomy.    Medications: The patient says that she takes no medications currently.    Allergies: The patient says that she is allergic to penicillin.    Social History: The patient denies smoking.  She denies consuming alcoholic beverages.  She says she occasionally smokes marijuana and otherwise does not use any other recreational drugs.    Review of Systems  General: The patient denies any fevers, chills, sweats.  Pulmonary: The patient denies any coughing, wheezing, chest pain, shortness of breath.  Cardiovascular: The patient denies any palpitations, dyspnea, chest pain.  Gastrointestinal: The patient denies any nausea, vomiting, diarrhea, constipation, hematochezia, melena, history of hepatitis, history of jaundice.  Genitourinary: The patient complains of pelvic pain.  Musculoskeletal: The patient denies any arthralgias or myalgias.   Neurological: No headaches or syncope or seizures.     Physical Exam:   Vital Signs: The patient's vital signs are stable and she is afebrile.  General: The patient appears well developed and well nourished and relaxed and alert and comfortable and in no apparent distress.    HEENT :  Normo-cephalic, atraumatic, pupils equal, round, reactive to light and accommodation, extra ocular motions intact, pharynx clear; there is no thyromegaly. There is no cervical lymphadenopathy.  Chest: Heart regular rate and rhythm, with no murmurs or rubs or gallops; the lungs are clear to auscultation bilaterally.  Abdomen: The abdomen is soft and flat and non-tender and non-distended.  There is no hepatomegaly. There is no splenomegaly.   Pelvic: Bimanual exam reveals absence of the uterus and reveals no adnexal masses either on the right or the left.  Extremities: No clubbing or cyanosis or edema.   Neurological: non-focal.     Assessment:   Pelvic pain  History of endometriosis.    Plan:   We will proceed today with laparoscopy with laparoscopic left oophorectomy.  Please see above.            ________________________  Martin Brown M.D.

## 2018-01-20 NOTE — OP REPORT
DATE OF SERVICE:  01/20/2018    PREOPERATIVE DIAGNOSES:  1.  Pelvic pain.  2.  History of endometriosis.    POSTOPERATIVE DIAGNOSES:  1.  Pelvic pain.  2.  History of endometriosis.    PROCEDURE PERFORMED:  Laparoscopy with laparoscopic left oophorectomy.    SURGEON:  Martin Brown MD    ANESTHESIA:  General endotracheal tube anesthesia.    ANESTHESIOLOGIST:  Maximus Dee MD    FINDINGS:  At laparoscopy, absence of the uterus is noted.  Absence of the   right fallopian tube is noted.  Absence of the right ovary is noted.  Absence   of the left fallopian tube is noted.  The left ovary is identified and appears   normal.  The left ovarian fossa appears normal.  At laparoscopy, no   intraperitoneal pelvic adhesions are seen.  Other than for some scar tissue   along the sides of the pelvis, no evidence of endometriosis is seen and no   lesions are seen.  After removal of the left ovary, the left side of the   pelvis is carefully examined and hemostasis is noted to be excellent.    SPECIMENS:  Left ovary.    COMPLICATIONS:  None.    ESTIMATED BLOOD LOSS:  Less than 20 mL.    DESCRIPTION OF PROCEDURE:  After the appropriate consents have been obtained,   the patient was taken to the operating room and given general anesthesia.  She   is prepped and draped in the dorsal lithotomy position and the bladder is   emptied with a catheter.  Attention is directed to the abdomen where a small   (approximately 1 cm) horizontal infraumbilical incision made with a scalpel.    A Veress needle was advanced through this incision into the peritoneal cavity   and proper placement in the peritoneal cavity was verified with palmar hanging   drop technique.  The peritoneal cavity was then insufflated with   approximately 2 to 3 liters of carbon dioxide gas.  The Veress needle was   removed and a 5-mm port was introduced through the infraumbilical incision   into the peritoneal cavity utilizing the VersaStep trocar system.  The  central   portion of this port was removed and the 5-mm 0-degree laparoscope was   inserted through the remaining sleeve and proper entry in the peritoneal   cavity was verified visually with a laparoscope.  The patient was placed in   Trendelenburg position.  A 10 to 12-mm port was introduced suprapubically   under direct laparoscopic visualization utilizing the AlintoStep trocar system.    A blunt probe was placed through the suprapubic port and used to manipulate   structures and findings are as noted above and pictures were taken.  The   laparoscope was then placed through the suprapubic port and a 5-mm LigaSure   bipolar cutting instrument was placed through the infraumbilical port and used   to grasp the attachments existing between the left ovary and the left side of   the pelvis.  Care was taken to ensure that the ureter is nowhere near where   tissue had been grasped.  After these observations were made, the attachments   existing between the left ovary and the left side of the pelvis including the   blood supply and blood drainage of the left ovary were thoroughly cauterized,   sealed, and cut with LigaSure [place] instrument, serially.  Excellent   hemostasis was observed.  The laparoscope was then placed through the   infraumbilical port and the EndoCatch bag was placed through the suprapubic   port and opened in the peritoneal cavity and used to scoop up the amputated   left ovary.  The mouth of the bag was closed over the specimen (the left   ovary) and the mouth of the bag was delivered along with the suprapubic port   through the suprapubic incision.  The bag with its contents, namely left   ovary, was delivered through the suprapubic incision.  The suprapubic port was   replaced.  Instrument was placed through the suprapubic port and used to   manipulate the structures and the left side of the pelvis was examined and   hemostasis was noted to be excellent.  The patient was taken out of   Trendelenburg  position.  The laparoscope was removed.  Air was allowed to   evacuate the peritoneal cavity and both ports were removed.  The fascia   underneath the suprapubic incision was identified with the use of S retractors   and reapproximated with the placement of a simple interrupted suture using   Vicryl.  Skin incisions were reapproximated with placement of multiple   interrupted buried sutures of 4-0 Monocryl placed in the dermis and 3 such   sutures are used for the suprapubic incision and 1 through the infraumbilical   incision.  The procedure was terminated with final lap and needle counts   reported to be correct at the end of the procedure.  The patient tolerated the   procedure well and sent to postanesthesia recovery in a stable condition.       ____________________________________     NILAM VALERO MD    MED / [facility]    DD:  01/20/2018 11:43:51  DT:  01/20/2018 12:03:15    D#:  [id number]  Job#:  [id number]    cc: SHIVA CASTANON MD

## 2019-04-29 ENCOUNTER — HOSPITAL ENCOUNTER (EMERGENCY)
Facility: MEDICAL CENTER | Age: 29
End: 2019-04-29
Attending: EMERGENCY MEDICINE

## 2019-04-29 VITALS
HEART RATE: 78 BPM | BODY MASS INDEX: 34.02 KG/M2 | TEMPERATURE: 97 F | SYSTOLIC BLOOD PRESSURE: 129 MMHG | HEIGHT: 63 IN | DIASTOLIC BLOOD PRESSURE: 58 MMHG | WEIGHT: 192 LBS | RESPIRATION RATE: 16 BRPM | OXYGEN SATURATION: 97 %

## 2019-04-29 DIAGNOSIS — R06.03 ACUTE RESPIRATORY DISTRESS: ICD-10-CM

## 2019-04-29 DIAGNOSIS — R06.2 WHEEZING: ICD-10-CM

## 2019-04-29 LAB
ANION GAP SERPL CALC-SCNC: 10 MMOL/L (ref 0–11.9)
BASOPHILS # BLD AUTO: 0.7 % (ref 0–1.8)
BASOPHILS # BLD: 0.08 K/UL (ref 0–0.12)
BLOOD CULTURE HOLD CXBCH: NORMAL
BUN SERPL-MCNC: 12 MG/DL (ref 8–22)
CALCIUM SERPL-MCNC: 9.7 MG/DL (ref 8.5–10.5)
CHLORIDE SERPL-SCNC: 105 MMOL/L (ref 96–112)
CO2 SERPL-SCNC: 23 MMOL/L (ref 20–33)
CREAT SERPL-MCNC: 0.82 MG/DL (ref 0.5–1.4)
D DIMER PPP IA.FEU-MCNC: 0.4 UG/ML (FEU) (ref 0–0.5)
EOSINOPHIL # BLD AUTO: 0.47 K/UL (ref 0–0.51)
EOSINOPHIL NFR BLD: 4 % (ref 0–6.9)
ERYTHROCYTE [DISTWIDTH] IN BLOOD BY AUTOMATED COUNT: 40.1 FL (ref 35.9–50)
GLUCOSE SERPL-MCNC: 94 MG/DL (ref 65–99)
HCT VFR BLD AUTO: 41.3 % (ref 37–47)
HGB BLD-MCNC: 14.3 G/DL (ref 12–16)
IMM GRANULOCYTES # BLD AUTO: 0.05 K/UL (ref 0–0.11)
IMM GRANULOCYTES NFR BLD AUTO: 0.4 % (ref 0–0.9)
LYMPHOCYTES # BLD AUTO: 2.93 K/UL (ref 1–4.8)
LYMPHOCYTES NFR BLD: 25 % (ref 22–41)
MCH RBC QN AUTO: 30.1 PG (ref 27–33)
MCHC RBC AUTO-ENTMCNC: 34.6 G/DL (ref 33.6–35)
MCV RBC AUTO: 86.9 FL (ref 81.4–97.8)
MONOCYTES # BLD AUTO: 0.76 K/UL (ref 0–0.85)
MONOCYTES NFR BLD AUTO: 6.5 % (ref 0–13.4)
NEUTROPHILS # BLD AUTO: 7.43 K/UL (ref 2–7.15)
NEUTROPHILS NFR BLD: 63.4 % (ref 44–72)
NRBC # BLD AUTO: 0 K/UL
NRBC BLD-RTO: 0 /100 WBC
PLATELET # BLD AUTO: 317 K/UL (ref 164–446)
PMV BLD AUTO: 11 FL (ref 9–12.9)
POTASSIUM SERPL-SCNC: 4.3 MMOL/L (ref 3.6–5.5)
RBC # BLD AUTO: 4.75 M/UL (ref 4.2–5.4)
SODIUM SERPL-SCNC: 138 MMOL/L (ref 135–145)
WBC # BLD AUTO: 11.7 K/UL (ref 4.8–10.8)

## 2019-04-29 PROCEDURE — 700111 HCHG RX REV CODE 636 W/ 250 OVERRIDE (IP): Performed by: EMERGENCY MEDICINE

## 2019-04-29 PROCEDURE — 85379 FIBRIN DEGRADATION QUANT: CPT

## 2019-04-29 PROCEDURE — 85025 COMPLETE CBC W/AUTO DIFF WBC: CPT

## 2019-04-29 PROCEDURE — 80048 BASIC METABOLIC PNL TOTAL CA: CPT

## 2019-04-29 PROCEDURE — 700101 HCHG RX REV CODE 250: Performed by: EMERGENCY MEDICINE

## 2019-04-29 PROCEDURE — 36415 COLL VENOUS BLD VENIPUNCTURE: CPT

## 2019-04-29 PROCEDURE — 94640 AIRWAY INHALATION TREATMENT: CPT

## 2019-04-29 PROCEDURE — 99284 EMERGENCY DEPT VISIT MOD MDM: CPT

## 2019-04-29 PROCEDURE — 96374 THER/PROPH/DIAG INJ IV PUSH: CPT

## 2019-04-29 RX ORDER — METHYLPREDNISOLONE SODIUM SUCCINATE 125 MG/2ML
125 INJECTION, POWDER, LYOPHILIZED, FOR SOLUTION INTRAMUSCULAR; INTRAVENOUS ONCE
Status: COMPLETED | OUTPATIENT
Start: 2019-04-29 | End: 2019-04-29

## 2019-04-29 RX ORDER — ALBUTEROL SULFATE 2.5 MG/3ML
2.5 SOLUTION RESPIRATORY (INHALATION) EVERY 4 HOURS PRN
Qty: 25 ML | Refills: 1 | Status: SHIPPED | OUTPATIENT
Start: 2019-04-29

## 2019-04-29 RX ORDER — PREDNISONE 20 MG/1
60 TABLET ORAL DAILY
Qty: 15 TAB | Refills: 0 | Status: SHIPPED | OUTPATIENT
Start: 2019-04-29 | End: 2019-05-04

## 2019-04-29 RX ADMIN — IPRATROPIUM BROMIDE 0.5 MG: 0.5 SOLUTION RESPIRATORY (INHALATION) at 06:38

## 2019-04-29 RX ADMIN — ALBUTEROL SULFATE 2.5 MG: 2.5 SOLUTION RESPIRATORY (INHALATION) at 06:38

## 2019-04-29 RX ADMIN — METHYLPREDNISOLONE SODIUM SUCCINATE 125 MG: 125 INJECTION, POWDER, FOR SOLUTION INTRAMUSCULAR; INTRAVENOUS at 06:59

## 2019-04-29 ASSESSMENT — COPD QUESTIONNAIRES
DURING THE PAST 4 WEEKS HOW MUCH DID YOU FEEL SHORT OF BREATH: SOME OF THE TIME
COPD SCREENING SCORE: 2
DO YOU EVER COUGH UP ANY MUCUS OR PHLEGM?: YES, A FEW DAYS A WEEK OR MONTH
HAVE YOU SMOKED AT LEAST 100 CIGARETTES IN YOUR ENTIRE LIFE: NO/DON'T KNOW

## 2019-04-29 ASSESSMENT — LIFESTYLE VARIABLES: EVER_SMOKED: NEVER

## 2019-04-29 NOTE — ED TRIAGE NOTES
"Carrie Wheelerrez   28 y.o. female   Chief Complaint   Patient presents with   • Shortness of Breath     woke up with it this morning, trouble catching her breath      Pt amb to triage with steady gait for above complaint. Pt went to bed last night with a slight cough, woke up this morning unable to catch her breath. Wheezing throughout all lobes, increased work of breathing noted in triage. Charge RN aware of pt. Pt to red 5     Pt is alert and oriented, speaking in full sentences, follows commands and responds appropriately to questions. NAD. Resp are even and unlabored.   Pt placed in lobby. Pt educated on triage process. Pt encouraged to alert staff for any changes.    /58   Pulse 60   Temp 36.1 °C (97 °F) (Temporal)   Resp 20   Ht 1.6 m (5' 3\")   Wt 87.1 kg (192 lb)   LMP 09/06/2011   SpO2 97%   BMI 34.01 kg/m²     "

## 2019-04-29 NOTE — ED PROVIDER NOTES
ED Provider Note    Scribed for Tobias Jhaveri M.D. by Richard Mcknight. 4/29/2019  6:03 AM    Primary care provider: Martin Brown M.D.  Means of arrival: Walk In  History obtained from: Patient  History limited by: None    CHIEF COMPLAINT  Chief Complaint   Patient presents with   • Shortness of Breath     woke up with it this morning, trouble catching her breath       HPI  Carrie Adler is a 28 y.o. female who presents to the Emergency Department for evaluation of shortness of breath. She states that Saturday she developed a cough which is mostly dry however has mild sputum production. Sunday she started having some wheezing with her breathing and cough, and then earlier this morning she woke up at approximately 2 AM and was having severe difficulty breathing. Carrie tried using one of her sons breathing treatments however this did not alleviate her symptoms and thus she came here for treatment. She denies any fever, chills, hemoptysis, or hematemesis. Carrie makes note that she recently was on hormone replacement therapy, however at this time has stopped.    REVIEW OF SYSTEMS  Pertinent positives include shortness of breath, cough, wheezing.   Pertinent negatives include no hemoptysis, hematemesis, fever, chills  All other systems reviewed and negative. See HPI for further details.       PAST MEDICAL HISTORY   has a past medical history of Endometriosis (7/17/2009); Endometriosis; and Seizure (HCC) (1998).    SURGICAL HISTORY   has a past surgical history that includes dilation and evacuation (12/18/2010); other abdominal surgery (7/17/2009); other (2016); other (2012); pelviscopy (1/20/2018); and oophorectomy (Left, 1/20/2018).    SOCIAL HISTORY  Social History   Substance Use Topics   • Smoking status: Former Smoker     Types: Cigarettes     Quit date: 1/11/2016   • Smokeless tobacco: Never Used      Comment: 2008   • Alcohol use No      History   Drug Use     Comment: marijuana rarely   "      FAMILY HISTORY  Family history reviewed. Family history not pertinent.    CURRENT MEDICATIONS  No current facility-administered medications on file prior to encounter.      Current Outpatient Prescriptions on File Prior to Encounter   Medication Sig Dispense Refill   • ibuprofen (MOTRIN) 800 MG Tab Take 1 Tab by mouth every 8 hours as needed for Mild Pain. 30 Tab 0   • estradiol (CLIMARA) 0.1 MG/24HR PATCH WEEKLY Apply 1 Patch to skin as directed every 7 days. 4 Patch 12   • valacyclovir (VALTREX) 500 MG Tab Take 500 mg by mouth 2 times a day.         ALLERGIES  Allergies   Allergen Reactions   • Penicillins Anaphylaxis and Rash     Rash and swelling in throat   • Eggs Anaphylaxis       PHYSICAL EXAM  VITAL SIGNS: /58   Pulse 60   Temp 36.1 °C (97 °F) (Temporal)   Resp 20   Ht 1.6 m (5' 3\")   Wt 87.1 kg (192 lb)   LMP 09/06/2011   SpO2 97%   BMI 34.01 kg/m²     Nursing note and vitals reviewed.  Constitutional: Well-developed and well-nourished. No distress.   HENT: Head is normocephalic and atraumatic. Oropharynx is clear and moist without exudate or erythema.   Eyes: Pupils are equal, round, and reactive to light. Conjunctiva are normal.   Cardiovascular: Normal rate and regular rhythm. No murmur heard. Normal radial pulses.  Pulmonary/Chest: Tachypnea, coarse breath sounds throughout, no rales, scattered rhonchi  Abdominal: Soft and non-tender. No distention    Musculoskeletal: Extremities exhibit normal range of motion without edema or tenderness.   Neurological: Awake, alert and oriented to person, place, and time. No focal deficits noted.  Skin: Skin is warm and dry. No rash.   Psychiatric: Anxious. Appropriate for clinical situation.    DIAGNOSTIC STUDIES / PROCEDURES    LABS  Results for orders placed or performed during the hospital encounter of 04/29/19   D-DIMER   Result Value Ref Range    D-Dimer Screen 0.40 0.00 - 0.50 ug/mL (FEU)   CBC WITH DIFFERENTIAL   Result Value Ref Range    " WBC 11.7 (H) 4.8 - 10.8 K/uL    RBC 4.75 4.20 - 5.40 M/uL    Hemoglobin 14.3 12.0 - 16.0 g/dL    Hematocrit 41.3 37.0 - 47.0 %    MCV 86.9 81.4 - 97.8 fL    MCH 30.1 27.0 - 33.0 pg    MCHC 34.6 33.6 - 35.0 g/dL    RDW 40.1 35.9 - 50.0 fL    Platelet Count 317 164 - 446 K/uL    MPV 11.0 9.0 - 12.9 fL    Neutrophils-Polys 63.40 44.00 - 72.00 %    Lymphocytes 25.00 22.00 - 41.00 %    Monocytes 6.50 0.00 - 13.40 %    Eosinophils 4.00 0.00 - 6.90 %    Basophils 0.70 0.00 - 1.80 %    Immature Granulocytes 0.40 0.00 - 0.90 %    Nucleated RBC 0.00 /100 WBC    Neutrophils (Absolute) 7.43 (H) 2.00 - 7.15 K/uL    Lymphs (Absolute) 2.93 1.00 - 4.80 K/uL    Monos (Absolute) 0.76 0.00 - 0.85 K/uL    Eos (Absolute) 0.47 0.00 - 0.51 K/uL    Baso (Absolute) 0.08 0.00 - 0.12 K/uL    Immature Granulocytes (abs) 0.05 0.00 - 0.11 K/uL    NRBC (Absolute) 0.00 K/uL   BMP   Result Value Ref Range    Sodium 138 135 - 145 mmol/L    Potassium 4.3 3.6 - 5.5 mmol/L    Chloride 105 96 - 112 mmol/L    Co2 23 20 - 33 mmol/L    Glucose 94 65 - 99 mg/dL    Bun 12 8 - 22 mg/dL    Creatinine 0.82 0.50 - 1.40 mg/dL    Calcium 9.7 8.5 - 10.5 mg/dL    Anion Gap 10.0 0.0 - 11.9   Blood Culture,Hold   Result Value Ref Range    Blood Culture Hold Collected    ESTIMATED GFR   Result Value Ref Range    GFR If African American >60 >60 mL/min/1.73 m 2    GFR If Non African American >60 >60 mL/min/1.73 m 2     All labs reviewed by me.    COURSE & MEDICAL DECISION MAKING  Nursing notes, VS, PMSFHx reviewed in chart.     Review of past medical records shows the patient was least seen here a year ago for GYN surgery related to endometriosis.     6:03 AM - Patient seen and examined at bedside. Patient will be treated with Solu-Medrol 125 mg, Atrovent 0.5 mg, Albuterol 2.5 mg. Ordered BMP, D-Dimer, CBC with differential, blood culture to evaluate her symptoms. The differential diagnoses include but are not limited to: Blood clot, reactive airway disease, viral URI.  Informed her that I will order labs to evaluate her and administer medications to help her breathing. She understands and agrees and will be informed of the lab results when they return.    7:07 AM - Patient was reevaluated at bedside who reports feeling improved after nebulizer treamtnet. On exam her lungs are now normal. Discussed lab results with the patient and informed them that they do not indicate any severe abnormalities and do not indicate any blood clots. Given her improved status after nebulizer and that she is no longer taking premarin I believe it is likely she has reactive airway disease. I will discharge her with prescriptions for steroids and albuterol. She understands and agrees to discharge and will return for any new or worsening symptoms.    HTN/IDDM FOLLOW UP:  The patient is referred to a primary physician for blood pressure management, diabetic screening, and for all other preventive health concerns    The patient will return for new or worsening symptoms and is stable at the time of discharge.    DISPOSITION:  Patient will be discharged home in stable condition.    FOLLOW UP:  Martin Brown M.D.  [address] [place] [id number] [phone]    Schedule an appointment as soon as possible for a visit       [facility], Emergency Dept  [address] [facility] [address] [phone]    If symptoms worsen      OUTPATIENT MEDICATIONS:  New Prescriptions    ALBUTEROL (PROVENTIL) 2.5MG/3ML NEBU SOLN SOLUTION FOR NEBULIZATION    3 mL by Nebulization route every four hours as needed for Shortness of Breath.    PREDNISONE (DELTASONE) 20 MG TAB    Take 3 Tabs by mouth every day for 5 days.       FINAL IMPRESSION  1. Wheezing    2. Acute respiratory distress          Richard PRIETO), am scribing for, and in the presence of, Tobias Jhaveri M.D..    Electronically signed by: Richard Cam), 4/29/2019    Tobias PRIETO M.D. personally  performed the services described in this documentation, as scribed by Richadr Mcknight in my presence, and it is both accurate and complete. C.    The note accurately reflects work and decisions made by me.  Tobias Jhaveri  4/29/2019  1:01 PM

## 2019-04-29 NOTE — ED NOTES
Assumed care of pt. ERP re-eval complete. Pt states understanding of dc instructions and f/u care. Pt agreeable to POC. Vitals WNL. NAD. Pt able to amb out w/ steady gait.

## 2019-07-10 ENCOUNTER — APPOINTMENT (OUTPATIENT)
Dept: RADIOLOGY | Facility: MEDICAL CENTER | Age: 29
End: 2019-07-10
Attending: EMERGENCY MEDICINE

## 2019-07-10 ENCOUNTER — HOSPITAL ENCOUNTER (EMERGENCY)
Facility: MEDICAL CENTER | Age: 29
End: 2019-07-10
Attending: EMERGENCY MEDICINE

## 2019-07-10 VITALS
TEMPERATURE: 98.6 F | HEIGHT: 63 IN | WEIGHT: 207.01 LBS | SYSTOLIC BLOOD PRESSURE: 122 MMHG | DIASTOLIC BLOOD PRESSURE: 63 MMHG | RESPIRATION RATE: 16 BRPM | OXYGEN SATURATION: 93 % | BODY MASS INDEX: 36.68 KG/M2 | HEART RATE: 59 BPM

## 2019-07-10 DIAGNOSIS — N64.4 BREAST PAIN: ICD-10-CM

## 2019-07-10 DIAGNOSIS — N63.0 BREAST MASS IN FEMALE: ICD-10-CM

## 2019-07-10 PROCEDURE — 76604 US EXAM CHEST: CPT

## 2019-07-10 PROCEDURE — 99284 EMERGENCY DEPT VISIT MOD MDM: CPT

## 2019-07-10 NOTE — ED NOTES
ERP at bedside. Pt agrees with plan of care discussed by ERP. AIDET acknowledged with patient. Sari in low position, side rail up for pt safety. Call light within reach. Will continue to monitor.

## 2019-07-10 NOTE — ED NOTES
D/c pt home,no  rx given . Pt aware of f/u instructions [facility]  , aware to return for any changes or concerns. No further questions upon d/c home from ed

## 2019-07-10 NOTE — ED PROVIDER NOTES
ED Provider Note    CHIEF COMPLAINT  Chief Complaint   Patient presents with   • Breast Pain     left lower breast pain  Ran into wall and then noticed that she has a lump in breast       HPI  Carrie Adler is a 28 y.o. female who presents with complaints of a pain and swelling to her left lower breast area.  The patient says she ran into a wall about a week ago and then noticed there was a painful swollen area under her left breast.  She called her gynecologist Dr. Brown, and says she was told to come to the ER.  The patient says the area is tender to the touch.  She has not noticed any redness or swelling to the breast or any drainage.  She has had no fever, chills, cough, congestion, any difficulty breathing.     REVIEW OF SYSTEMS  See HPI for further details. All other systems are negative.     PAST MEDICAL HISTORY  Past Medical History:   Diagnosis Date   • Endometriosis 7/17/2009   • Endometriosis    • Seizure (HCC) 1998       FAMILY HISTORY  History reviewed. No pertinent family history.    SOCIAL HISTORY  Social History     Social History   • Marital status:      Spouse name: N/A   • Number of children: N/A   • Years of education: N/A     Social History Main Topics   • Smoking status: Former Smoker     Types: Cigarettes     Quit date: 1/11/2016   • Smokeless tobacco: Never Used      Comment: 2008   • Alcohol use No   • Drug use: Yes      Comment: marijuana rarely    • Sexual activity: Not on file     Other Topics Concern   • Not on file     Social History Narrative   • No narrative on file       SURGICAL HISTORY  Past Surgical History:   Procedure Laterality Date   • PELVISCOPY  1/20/2018    Procedure: PELVISCOPY;  Surgeon: Martin Brown M.D.;  Location: [facility];  Service: Gynecology   • OOPHORECTOMY Left 1/20/2018    Procedure: OOPHORECTOMY;  Surgeon: Martin Brown M.D.;  Location: [facility];  Service: Gynecology   • OTHER  2016    right ovary  "removed   • OTHER  2012    hysterectomy   • DILATION AND EVACUATION  12/18/2010    Performed by NILAM VALERO at SURGERY [facility]S   • OTHER ABDOMINAL SURGERY  7/17/2009    appendectomy       CURRENT MEDICATIONS  Home Medications    **Home medications have not yet been reviewed for this encounter**         ALLERGIES  Allergies   Allergen Reactions   • Penicillins Anaphylaxis and Rash     Rash and swelling in throat   • Eggs Anaphylaxis       PHYSICAL EXAM  VITAL SIGNS: /67   Pulse (!) 56   Temp 36.7 °C (98.1 °F) (Temporal)   Resp 16   Ht 1.6 m (5' 3\")   Wt 93.9 kg (207 lb 0.2 oz)   LMP 09/06/2011   SpO2 99%   BMI 36.67 kg/m²   Constitutional: Awake, alert, in no acute distress, Non-toxic appearance.   HENT: Atraumatic. Bilateral external ears normal, mucous membranes moist, throat nonerythematous without exudates, nose is normal.  Eyes: PERRL, EOMI, conjunctiva moist, noninjected.  Neck: Nontender, Normal range of motion, No nuchal rigidity, No stridor.   Lymphatic: No lymphadenopathy noted.   Cardiovascular: Regular rate and rhythm, no murmurs, rubs, gallops.  Thorax & Lungs:  Good breath sounds bilaterally, no wheezes, rales, or retractions.  No chest tenderness.  Breast: Exam is focused to the left breast.  There is a mobile palpable tender mass about 1 to 2 cm in diameter.  There is no surrounding erythema, induration.  The areola looks normal, the nipple looks normal, with no dimpling to the breast.  Abdomen: Bowel sounds normal, Soft, nontender, nondistended, no rebound, guarding, masses.  Back: No CVA or spinal tenderness.  Extremities: Intact distal pulses, No edema, No tenderness.   Skin: Warm, Dry, No rashes.  No erythema, induration, or increased warmth noted over around the skin of the chest or breast.      RADIOLOGY/PROCEDURES  US-CHEST   Final Result      No abnormalities are identified on limited left chest ultrasound.            COURSE & MEDICAL DECISION MAKING  Pertinent Labs " & Imaging studies reviewed. (See chart for details)  The patient presents with the above complaints.  I discussed the evaluation of breast masses in the emerge department.  I told her we do not attempt to diagnose any types of breast cancer in the emerge department, and that is usually done at the breast center.  I told her we could evaluate her for possible hematoma or infection but any other type of mass would need to be further evaluated at the breast center.  Ultrasound of the left breast was negative for any obvious hematoma, fluid collection or abscess.  I called the ER  to see if she can arrange appointment for follow-up at the breast center.  I was told that the patient would need to obtain a referral from her PCP as any results from the center are to be called to the ordering physician.  Findings were discussed with the patient.  I was able to contact Dr. Martin Brown her gynecologist, and he will send a referral to the breast center for the patient.  The patient is to call the center tomorrow for an appointment.  She is to return to ER for any worsening pain, redness, swelling, fever, or any other problems.    FINAL IMPRESSION  1.  Left breast mass  2.   3.         Electronically signed by: Nile Beltran, 7/10/2019 12:23 PM

## 2019-07-17 ENCOUNTER — HOSPITAL ENCOUNTER (OUTPATIENT)
Dept: RADIOLOGY | Facility: MEDICAL CENTER | Age: 29
End: 2019-07-17
Attending: SPECIALIST

## 2019-07-17 DIAGNOSIS — N63.0 LUMP OR MASS IN BREAST: ICD-10-CM

## 2019-07-17 PROCEDURE — 76642 ULTRASOUND BREAST LIMITED: CPT | Mod: LT

## 2019-07-17 PROCEDURE — G0279 TOMOSYNTHESIS, MAMMO: HCPCS

## 2019-09-09 ENCOUNTER — TELEPHONE (OUTPATIENT)
Dept: RADIOLOGY | Facility: MEDICAL CENTER | Age: 29
End: 2019-09-09

## 2019-09-09 NOTE — TELEPHONE ENCOUNTER
pt sched 9/10/19 for diag/us for breast lump; pt had recent imaging on 7/17/19; is this a new problem?/tml

## 2019-09-10 ENCOUNTER — HOSPITAL ENCOUNTER (OUTPATIENT)
Dept: RADIOLOGY | Facility: MEDICAL CENTER | Age: 29
End: 2019-09-10
Attending: SPECIALIST

## 2019-09-10 ENCOUNTER — APPOINTMENT (OUTPATIENT)
Dept: RADIOLOGY | Facility: MEDICAL CENTER | Age: 29
End: 2019-09-10
Attending: SPECIALIST

## 2019-09-10 DIAGNOSIS — N63.0 LUMP OR MASS IN BREAST: ICD-10-CM

## 2019-09-10 PROCEDURE — 76642 ULTRASOUND BREAST LIMITED: CPT | Mod: RT

## 2020-01-29 ENCOUNTER — HOSPITAL ENCOUNTER (EMERGENCY)
Facility: MEDICAL CENTER | Age: 30
End: 2020-01-29
Attending: EMERGENCY MEDICINE

## 2020-01-29 ENCOUNTER — APPOINTMENT (OUTPATIENT)
Dept: RADIOLOGY | Facility: MEDICAL CENTER | Age: 30
End: 2020-01-29
Attending: EMERGENCY MEDICINE

## 2020-01-29 VITALS
OXYGEN SATURATION: 98 % | HEART RATE: 54 BPM | DIASTOLIC BLOOD PRESSURE: 64 MMHG | BODY MASS INDEX: 35.51 KG/M2 | WEIGHT: 200.4 LBS | HEIGHT: 63 IN | TEMPERATURE: 97.2 F | RESPIRATION RATE: 16 BRPM | SYSTOLIC BLOOD PRESSURE: 107 MMHG

## 2020-01-29 DIAGNOSIS — S09.90XA CLOSED HEAD INJURY, INITIAL ENCOUNTER: ICD-10-CM

## 2020-01-29 DIAGNOSIS — G93.5 CHIARI I MALFORMATION (HCC): ICD-10-CM

## 2020-01-29 PROCEDURE — 99283 EMERGENCY DEPT VISIT LOW MDM: CPT

## 2020-01-29 PROCEDURE — 70450 CT HEAD/BRAIN W/O DYE: CPT

## 2020-01-29 PROCEDURE — 72125 CT NECK SPINE W/O DYE: CPT

## 2020-01-29 NOTE — DISCHARGE INSTRUCTIONS
Please follow-up with your primary doctor.  Return for vomiting progressive headache any neurologic symptoms such as trouble with speech vision etc.  Incidentally you also have what appears to be a Chiari I malformation.  Please discuss with your primary in regards to referral to neurosurgery.

## 2020-01-29 NOTE — ED TRIAGE NOTES
Pt ambulates to triage  Chief Complaint   Patient presents with   • Head Injury   pt reports 1 wk ago she was running out of her garage when the door was closing and she ran into a . Hit head on lawnmower. + LOC. Reports she vomited x 1 today and has had a continued headache.   Pt A & 0 x 4, speech clear, ambulates well  Pt asked to wait in lobby, pt updated on triage process and pt asked to inform RN of any changes.

## 2020-01-29 NOTE — ED PROVIDER NOTES
CHIEF COMPLAINT  Chief Complaint   Patient presents with   • Head Injury       HPI  Carrie Adler is a 29 y.o. female who presents 1 week out from a head injury.  She states that she tried to jump through the garage door as it was closing and apparently fell and hit her head-she believes she hit it on her lawnmower.  She did have positive LOC.  He has had a persistent headache throughout the week and then had an episode of vomiting today.  No numbness or weakness.  No trouble with speech or vision.  She does note some associated neck pain which is posterior and towards the right side.  No injury to her chest or abdomen.  No leg injury.    REVIEW OF SYSTEMS  All other systems are negative.     PAST MEDICAL HISTORY  Past Medical History:   Diagnosis Date   • Endometriosis 2009   • Endometriosis    • Seizure (HCC)        FAMILY HISTORY  No family history on file.    SOCIAL HISTORY  Social History     Socioeconomic History   • Marital status:      Spouse name: Not on file   • Number of children: Not on file   • Years of education: Not on file   • Highest education level: Not on file   Occupational History   • Not on file   Social Needs   • Financial resource strain: Not on file   • Food insecurity:     Worry: Not on file     Inability: Not on file   • Transportation needs:     Medical: Not on file     Non-medical: Not on file   Tobacco Use   • Smoking status: Former Smoker     Types: Cigarettes     Last attempt to quit: 2016     Years since quittin.0   • Smokeless tobacco: Never Used   • Tobacco comment: 2008   Substance and Sexual Activity   • Alcohol use: No   • Drug use: Yes     Comment: marijuana rarely    • Sexual activity: Not on file   Lifestyle   • Physical activity:     Days per week: Not on file     Minutes per session: Not on file   • Stress: Not on file   Relationships   • Social connections:     Talks on phone: Not on file     Gets together: Not on file      "Attends Shinto service: Not on file     Active member of club or organization: Not on file     Attends meetings of clubs or organizations: Not on file     Relationship status: Not on file   • Intimate partner violence:     Fear of current or ex partner: Not on file     Emotionally abused: Not on file     Physically abused: Not on file     Forced sexual activity: Not on file   Other Topics Concern   • Not on file   Social History Narrative   • Not on file       SURGICAL HISTORY  Past Surgical History:   Procedure Laterality Date   • PELVISCOPY  1/20/2018    Procedure: PELVISCOPY;  Surgeon: Martin Valero M.D.;  Location: [facility];  Service: Gynecology   • OOPHORECTOMY Left 1/20/2018    Procedure: OOPHORECTOMY;  Surgeon: Martin Valero M.D.;  Location: [facility];  Service: Gynecology   • OTHER  2016    right ovary removed   • OTHER  2012    hysterectomy   • DILATION AND EVACUATION  12/18/2010    Performed by MARTIN VALERO at SURGERY [facility]   • OTHER ABDOMINAL SURGERY  7/17/2009    appendectomy       CURRENT MEDICATIONS  Home Medications     Reviewed by Patti Castle R.N. (Registered Nurse) on 01/29/20 at 1444  Med List Status: Partial   Medication Last Dose Status   albuterol (PROVENTIL) 2.5mg/3ml Nebu Soln solution for nebulization  Active   estradiol (CLIMARA) 0.1 MG/24HR PATCH WEEKLY  Active   ibuprofen (MOTRIN) 800 MG Tab 1/29/2020 Active   valacyclovir (VALTREX) 500 MG Tab  Active                ALLERGIES  Allergies   Allergen Reactions   • Penicillins Anaphylaxis and Rash     Rash and swelling in throat   • Eggs Anaphylaxis       PHYSICAL EXAM  VITAL SIGNS: /75   Pulse (!) 57   Temp 36.2 °C (97.2 °F) (Temporal)   Resp 18   Ht 1.6 m (5' 3\")   Wt 90.9 kg (200 lb 6.4 oz)   LMP 09/06/2011   SpO2 98%   BMI 35.50 kg/m²      Constitutional: Well developed, Well nourished, No acute distress, Non-toxic appearance.   HENT: Normocephalic, Atraumatic, TMs " normal, mucous membranes moist, no erythema, exudates, swelling, or masses, nares patent  Eyes: nonicteric  Neck: Supple, there is midline C-spine tenderness and tenderness towards the right paraspinal muscles  Lymphatic: No lymphadenopathy noted.   Cardiovascular: Regular rate and rhythm, no gallops rubs or murmurs  Lungs: Clear bilaterally   Abdomen: Bowel sounds normal, Soft, No tenderness, No pulsatile masses.   Skin: Warm, Dry, no rash  Back: No tenderness, No CVA tenderness.   Genitalia: Deferred  Rectal: Deferred  Extremities: No edema  Neurologic: Alert, appropriate, follows commands, moving all extremities, normal speech   Psychiatric: Affect normal    RADIOLOGY/PROCEDURES  CT-CSPINE WITHOUT PLUS RECONS   Final Result      1.  Straightening of cervical spine.   2.  No fracture or subluxation.   3.  Significantly enlarged lingual tonsils with associated moderate bilateral cervical adenopathy.      CT-HEAD W/O   Final Result      1.  No acute intracranial abnormality.   2.  Probable Chiari I malformation.        COURSE & MEDICAL DECISION MAKING  Pertinent Labs & Imaging studies reviewed. (See chart for details)  This is a 29-year-old female status post fall with positive LOC and one episode of vomiting today.  The patient has no focal neurologic deficits on exam.  She otherwise has no evidence of intracranial hemorrhage fracture or other process noted on CT head or C-spine.  She does have enlarged lingual tonsils as well as some adenopathy in her neck which was discussed with her.  She also has what appears to be a Chiari I malformation-I advised that she discuss with her primary in regards to referral to neurosurgery.    FINAL IMPRESSION  1.  Minor head injury  2.  Chiari I malformation  3.         Electronically signed by: Uzair Alex M.D., 1/29/2020 3:10 PM

## 2021-08-03 ENCOUNTER — HOSPITAL ENCOUNTER (EMERGENCY)
Facility: MEDICAL CENTER | Age: 31
End: 2021-08-03
Attending: EMERGENCY MEDICINE
Payer: OTHER MISCELLANEOUS

## 2021-08-03 ENCOUNTER — APPOINTMENT (OUTPATIENT)
Dept: RADIOLOGY | Facility: MEDICAL CENTER | Age: 31
End: 2021-08-03
Payer: OTHER MISCELLANEOUS

## 2021-08-03 ENCOUNTER — APPOINTMENT (OUTPATIENT)
Dept: RADIOLOGY | Facility: MEDICAL CENTER | Age: 31
End: 2021-08-03

## 2021-08-03 VITALS
BODY MASS INDEX: 29.66 KG/M2 | HEIGHT: 67 IN | DIASTOLIC BLOOD PRESSURE: 54 MMHG | SYSTOLIC BLOOD PRESSURE: 101 MMHG | HEART RATE: 64 BPM | RESPIRATION RATE: 16 BRPM | OXYGEN SATURATION: 98 % | WEIGHT: 189 LBS | TEMPERATURE: 97.4 F
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
## 2021-08-03 DIAGNOSIS — S16.1XXA STRAIN OF NECK MUSCLE, INITIAL ENCOUNTER: ICD-10-CM

## 2021-08-03 PROCEDURE — 99284 EMERGENCY DEPT VISIT MOD MDM: CPT

## 2021-08-03 PROCEDURE — 305948 HCHG GREEN TRAUMA ACT PRE-NOTIFY NO CC

## 2021-08-03 PROCEDURE — A9270 NON-COVERED ITEM OR SERVICE: HCPCS | Performed by: EMERGENCY MEDICINE

## 2021-08-03 PROCEDURE — 72040 X-RAY EXAM NECK SPINE 2-3 VW: CPT

## 2021-08-03 PROCEDURE — 73000 X-RAY EXAM OF COLLAR BONE: CPT | Mod: RT

## 2021-08-03 PROCEDURE — 700102 HCHG RX REV CODE 250 W/ 637 OVERRIDE(OP): Performed by: EMERGENCY MEDICINE

## 2021-08-03 PROCEDURE — 71045 X-RAY EXAM CHEST 1 VIEW: CPT

## 2021-08-03 RX ORDER — IBUPROFEN 600 MG/1
600 TABLET ORAL ONCE
Status: COMPLETED | OUTPATIENT
Start: 2021-08-03 | End: 2021-08-03

## 2021-08-03 RX ADMIN — IBUPROFEN 600 MG: 600 TABLET, FILM COATED ORAL at 18:07
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
## 2021-08-04 NOTE — ED NOTES
Pt discharged; Pt verbalized understanding of discharge education as well as signs and symptoms for which to return to the ED. [facility] for [facility].

## (undated) DEVICE — PACK LAPAROSCOPY - (1/CA)

## (undated) DEVICE — NEEDLE INSUFFLATION FOR STEP - (12/BX)

## (undated) DEVICE — NEPTUNE 4 PORT MANIFOLD - (20/PK)

## (undated) DEVICE — HEAD HOLDER JUNIOR/ADULT

## (undated) DEVICE — SET SUCTION/IRRIGATION WITH DISPOSABLE TIP (6/CA )PART #0250-070-520 IS A SUB

## (undated) DEVICE — GLOVE BIOGEL SZ 7.5 SURGICAL PF LTX - (50PR/BX 4BX/CA)

## (undated) DEVICE — SYRINGE DISP. 12 CC LL - (100/BX)

## (undated) DEVICE — GLOVE BIOGEL ECLIPSE PF LATEX SIZE 7.5

## (undated) DEVICE — FORCEP BIPO CUTTING (EVEREST) - 5MM (5EA/BX)

## (undated) DEVICE — SENSOR SPO2 NEO LNCS ADHESIVE (20/BX) SEE USER NOTES

## (undated) DEVICE — PROTECTOR ULNA NERVE - (36PR/CA)

## (undated) DEVICE — SUTURE GENERAL

## (undated) DEVICE — CANISTER SUCTION 3000ML MECHANICAL FILTER AUTO SHUTOFF MEDI-VAC NONSTERILE LF DISP  (40EA/CA)

## (undated) DEVICE — SET EXTENSION WITH 2 PORTS (48EA/CA) ***PART #2C8610 IS A SUBSTITUTE*****

## (undated) DEVICE — KIT ROOM DECONTAMINATION

## (undated) DEVICE — TROCAR STEP 5MM - (3/CA)

## (undated) DEVICE — PAD SANITARY 11IN MAXI IND WRAPPED  (12EA/PK 24PK/CA)

## (undated) DEVICE — BANDAID SHEER STRIP 3/4 IN (100EA/BX 12BX/CA)

## (undated) DEVICE — MASK ANESTHESIA ADULT  - (100/CA)

## (undated) DEVICE — SODIUM CHL IRRIGATION 0.9% 1000ML (12EA/CA)

## (undated) DEVICE — GOWN WARMING STANDARD FLEX - (30/CA)

## (undated) DEVICE — ELECTRODE 850 FOAM ADHESIVE - HYDROGEL RADIOTRNSPRNT (50/PK)

## (undated) DEVICE — SUTURE 0 VICRYL PLUS UR-6 - 27 INCH (36/BX)

## (undated) DEVICE — ELECTRODE DUAL RETURN W/ CORD - (50/PK)

## (undated) DEVICE — SLEEVE, VASO, THIGH, MED

## (undated) DEVICE — SUCTION INSTRUMENT YANKAUER BULBOUS TIP W/O VENT (50EA/CA)

## (undated) DEVICE — GOWN SURGEONS X-LARGE - DISP. (30/CA)

## (undated) DEVICE — SET LEADWIRE 5 LEAD BEDSIDE DISPOSABLE ECG (1SET OF 5/EA)

## (undated) DEVICE — KIT ANESTHESIA W/CIRCUIT & 3/LT BAG W/FILTER (20EA/CA)

## (undated) DEVICE — SUTURE 4-0 MONOCRYL PLUS PS-2 - 27 INCH (36/BX)

## (undated) DEVICE — TUBE E-T HI-LO CUFF 7.5MM (10EA/PK)

## (undated) DEVICE — BAG RETRIEVAL 10ML (10EA/BX)

## (undated) DEVICE — TUBING CLEARLINK DUO-VENT - C-FLO (48EA/CA)

## (undated) DEVICE — LACTATED RINGERS INJ 1000 ML - (14EA/CA 60CA/PF)